# Patient Record
Sex: MALE | Race: WHITE | Employment: FULL TIME | URBAN - NONMETROPOLITAN AREA
[De-identification: names, ages, dates, MRNs, and addresses within clinical notes are randomized per-mention and may not be internally consistent; named-entity substitution may affect disease eponyms.]

---

## 2023-10-03 ENCOUNTER — HOSPITAL ENCOUNTER (OUTPATIENT)
Dept: NEUROLOGY | Age: 33
Discharge: HOME | End: 2023-10-03
Payer: COMMERCIAL

## 2023-10-03 PROCEDURE — 95886 MUSC TEST DONE W/N TEST COMP: CPT

## 2023-10-03 PROCEDURE — 95910 NRV CNDJ TEST 7-8 STUDIES: CPT

## 2023-10-03 NOTE — PROCEDURES
by the F responses were not recordable  in the right peroneal and tibial nerves. Nerve  Conductions   Results  Were  Personally  Reviewed and  Analysed. Abnormal  Nerve  Conductions  Were  Personally  Repeated,  Verified, reconfirmed  And  Updated as needed  appropriately. Please    See   Wave  Forms   And    Details  Of     Nerve  Conduction   Studies   For  Additional  Information             Extensive   Needle  Electromyography  Was personally  Performed  In   right      Lower  Extremity   In  The  Following  Muscles :      Gluteus  Medius,   Vastus  Lateralis,  Internal  Hamstring,    External  Hamstring,   Anterior Tibialis,  Medial  Gastrocnemius,          Extensive  Needle  Electromyography  Shows      A) Normal  insertional  Activity. There  Is  Absence  Of   P  Waves,  Fibrillations,  Fasciculations and        Other  Spontaneous   Activity. B) Voluntary  Motor unit  Potentials    Show :    Normal  Effort. Decreased   Recruitment, Increased amplitude &  Duration. Polyphasic features  Noted  In  The  Above  Examined  muscles            IMPRESSION:      1. There is electrodiagnostic evidence of chronic L4-L5 and L5-S1         radiculopathy involving the examined right lower  extremity. 2. Needle  electromyography  showed   No active denervation changes. 3.  There is no electrodiagnostic evidence of right peroneal or tibial         Motor  mononeuropathies. 4.  Right superficial sensory peroneal not recordable. Further clinical correlation and followup recommended. Cynthia Negrete MD, [facility] Brown     Board Certified in Neurology  & in  [facility] [address] [facility] ([facility]).          D: 10/03/2023 15:22:00       T: 10/03/2023 16:23:54     [id number]  Job#: [id number]     Doc#: [id number]    CC:

## 2023-10-03 NOTE — PROGRESS NOTES
EMG/NCS Right    lower Completed    PCP: Derrick Madera MD    Ordering: Anne Engle    Interpreting Physician: Karin Ritchie, Neurologist    Technician: Javier Lock RCP